# Patient Record
Sex: FEMALE | Race: BLACK OR AFRICAN AMERICAN
[De-identification: names, ages, dates, MRNs, and addresses within clinical notes are randomized per-mention and may not be internally consistent; named-entity substitution may affect disease eponyms.]

---

## 2022-11-04 ENCOUNTER — HOSPITAL ENCOUNTER (OUTPATIENT)
Dept: HOSPITAL 19 - INPTSU | Age: 27
Setting detail: OBSERVATION
Discharge: HOME | End: 2022-11-04
Attending: UROLOGY | Admitting: UROLOGY
Payer: OTHER GOVERNMENT

## 2022-11-04 VITALS — SYSTOLIC BLOOD PRESSURE: 105 MMHG | DIASTOLIC BLOOD PRESSURE: 91 MMHG | HEART RATE: 78 BPM

## 2022-11-04 VITALS — TEMPERATURE: 98.6 F | DIASTOLIC BLOOD PRESSURE: 68 MMHG | HEART RATE: 95 BPM | SYSTOLIC BLOOD PRESSURE: 112 MMHG

## 2022-11-04 VITALS — SYSTOLIC BLOOD PRESSURE: 109 MMHG | DIASTOLIC BLOOD PRESSURE: 62 MMHG | HEART RATE: 92 BPM

## 2022-11-04 VITALS — DIASTOLIC BLOOD PRESSURE: 73 MMHG | SYSTOLIC BLOOD PRESSURE: 120 MMHG | HEART RATE: 80 BPM

## 2022-11-04 VITALS — BODY MASS INDEX: 22.11 KG/M2 | WEIGHT: 140.88 LBS | HEIGHT: 67.01 IN

## 2022-11-04 VITALS — HEART RATE: 87 BPM | DIASTOLIC BLOOD PRESSURE: 71 MMHG | TEMPERATURE: 98.7 F | SYSTOLIC BLOOD PRESSURE: 107 MMHG

## 2022-11-04 VITALS — DIASTOLIC BLOOD PRESSURE: 61 MMHG | HEART RATE: 85 BPM | SYSTOLIC BLOOD PRESSURE: 104 MMHG

## 2022-11-04 VITALS — DIASTOLIC BLOOD PRESSURE: 68 MMHG | SYSTOLIC BLOOD PRESSURE: 106 MMHG | HEART RATE: 93 BPM

## 2022-11-04 VITALS — SYSTOLIC BLOOD PRESSURE: 109 MMHG | DIASTOLIC BLOOD PRESSURE: 77 MMHG | HEART RATE: 93 BPM

## 2022-11-04 VITALS — HEART RATE: 92 BPM | DIASTOLIC BLOOD PRESSURE: 67 MMHG | SYSTOLIC BLOOD PRESSURE: 109 MMHG

## 2022-11-04 DIAGNOSIS — N13.0: Primary | ICD-10-CM

## 2022-11-04 PROCEDURE — C2617 STENT, NON-COR, TEM W/O DEL: HCPCS

## 2022-11-04 PROCEDURE — G0378 HOSPITAL OBSERVATION PER HR: HCPCS

## 2022-11-04 NOTE — NUR
Patient arrived to the unit via bed from pacu. Patient drowsy, easily to
arouse. VSS. Lungs, CTA. Bowel sounds active. Patient reports of mild pain at
LLQ. Patient oriented to room. ICE chips offered and patient is tolerating
well. Family at the bedside.

## 2022-11-04 NOTE — NUR
Patient sitting up in bed eating dinner. Patien denies pain and no episode of
nausea vomiting. Family at the bedside.

## 2022-11-04 NOTE — NUR
Patient A/Ox4, VSS, NAD, tolerated her supper, able to urinate, got a
discharge order from Dr. Goff, discharge instructions given, questions
answered, home with friends, escorted by staff.

## 2022-11-04 NOTE — NUR
The patient was taken back to the operating room via cart at this time. The
patient's visitors were sent back to the waiting room to receive updates. The
patient's belongings were taken over to the revoery oom and will be
transferred with the patient to the 3rd floor post operatively.

## 2023-01-30 ENCOUNTER — HOSPITAL ENCOUNTER (OUTPATIENT)
Dept: HOSPITAL 19 - COL.RAD | Age: 28
End: 2023-01-30
Attending: UROLOGY
Payer: OTHER GOVERNMENT

## 2023-01-30 DIAGNOSIS — N13.30: Primary | ICD-10-CM

## 2023-01-30 PROCEDURE — A9562 TC99M MERTIATIDE: HCPCS
